# Patient Record
Sex: MALE | Race: WHITE | Employment: OTHER | URBAN - METROPOLITAN AREA
[De-identification: names, ages, dates, MRNs, and addresses within clinical notes are randomized per-mention and may not be internally consistent; named-entity substitution may affect disease eponyms.]

---

## 2020-04-06 ENCOUNTER — OFFICE VISIT (OUTPATIENT)
Dept: SURGERY | Age: 65
End: 2020-04-06

## 2020-04-06 VITALS
HEIGHT: 74 IN | SYSTOLIC BLOOD PRESSURE: 168 MMHG | WEIGHT: 260 LBS | BODY MASS INDEX: 33.37 KG/M2 | HEART RATE: 60 BPM | DIASTOLIC BLOOD PRESSURE: 95 MMHG | TEMPERATURE: 98.7 F | OXYGEN SATURATION: 94 % | RESPIRATION RATE: 16 BRPM

## 2020-04-06 DIAGNOSIS — R10.32 LEFT GROIN PAIN: Primary | ICD-10-CM

## 2020-04-06 PROBLEM — E66.9 CLASS 1 OBESITY IN ADULT: Status: ACTIVE | Noted: 2020-04-06

## 2020-04-06 NOTE — PROGRESS NOTES
1. Have you been to the ER, urgent care clinic since your last visit? Hospitalized since your last visit? No    2. Have you seen or consulted any other health care providers outside of the [address] [place] [address] since your last visit? Include any pap smears or colon screening.  No No

## 2020-04-06 NOTE — PROGRESS NOTES
Jerome Zurita is a 60 y.o. male who presents for evaluation of left groin pain and possible recurrent left inguinal hernia. Mr. Ludmila Adler tells me that he is s/p [facility] repair approximately 15 years ago. He believes that mesh was placed at the time of surgery. Doing well until recently when he began experiencing pain in his left groin. The pain occurs with coughing, sneezing or exertion. He has not noted a discrete bulge in his left groin. No right groin pain or associated bulge. He has otherwise been in his usual state of health. Past Medical History:   Diagnosis Date    Class 1 obesity in adult 4/6/2020    Left groin pain 4/6/2020     Past Surgical History:   Procedure Laterality Date    CARDIAC SURG PROCEDURE UNLIST  2012    aortic dissection    HX HERNIA REPAIR       History reviewed. No pertinent family history. Social History     Socioeconomic History    Marital status:      Spouse name: Not on file    Number of children: Not on file    Years of education: Not on file    Highest education level: Not on file   Tobacco Use    Smoking status: Former Smoker    Smokeless tobacco: Never Used   Substance and Sexual Activity    Alcohol use: Not Currently    Drug use: Never    Sexual activity: Not Currently     Review of systems negative except as noted. Review of Systems   Respiratory: Negative for cough. Gastrointestinal: Positive for abdominal pain (Left groin. ). Negative for constipation, nausea and vomiting. Physical Exam  Vitals signs reviewed. Constitutional:       General: He is not in acute distress. Appearance: Normal appearance. HENT:      Head: Normocephalic and atraumatic. Eyes:      General: No scleral icterus. Neck:      Musculoskeletal: Neck supple. Cardiovascular:      Rate and Rhythm: Normal rate and regular rhythm. Pulmonary:      Effort: Pulmonary effort is normal.      Breath sounds: Normal breath sounds.    Abdominal:      General: There is no distension. Palpations: Abdomen is soft. Tenderness: There is no abdominal tenderness. There is no guarding or rebound. Hernia: No hernia is present. There is no hernia in the right inguinal area or left inguinal area (No apparent recurrent direct or indirect left inguinal hernia.). Musculoskeletal: Normal range of motion. Lymphadenopathy:      Cervical: No cervical adenopathy. Neurological:      General: No focal deficit present. Mental Status: He is alert. ASSESSMENT and PLAN  Explained to Mr. Columba Muller that it is unclear whether or not he has a recurrent left inguinal hernia. Will check a scrotal ultrasound to better evaluate left groin and will see him following that to review findings with him. Activity as tolerated for now. Anti-inflammatories as needed. He is agreeable to this plan and is most certainly free to contact the office should any questions or concerns arise.

## 2020-04-07 ENCOUNTER — HOSPITAL ENCOUNTER (OUTPATIENT)
Dept: ULTRASOUND IMAGING | Age: 65
Discharge: HOME | End: 2020-04-07
Attending: SURGERY
Payer: MEDICARE

## 2020-04-07 DIAGNOSIS — R10.32 LEFT GROIN PAIN: ICD-10-CM

## 2020-04-07 PROCEDURE — 76882 US LMTD JT/FCL EVL NVASC XTR: CPT

## 2020-04-09 ENCOUNTER — TELEPHONE (OUTPATIENT)
Dept: SURGERY | Age: 65
End: 2020-04-09

## 2020-05-07 ENCOUNTER — TELEPHONE (OUTPATIENT)
Dept: SURGERY | Age: 65
End: 2020-05-07

## 2020-05-07 NOTE — TELEPHONE ENCOUNTER
Cookie Ortiz left a message today with request to schedule surgery for a hernia.   I have contacted him to tell him that his message has been sent to Dr. Jean Pierre Pelaez and I'll contact him once I have an order to schedule the surgery

## 2020-05-19 ENCOUNTER — TELEPHONE (OUTPATIENT)
Dept: SURGERY | Age: 65
End: 2020-05-19

## 2020-05-19 NOTE — TELEPHONE ENCOUNTER
I left a voicemail for patient that we would need to r/s his appointment this morning due to Dr. Westley Cardoza being out of the office.

## 2020-05-26 ENCOUNTER — TELEPHONE (OUTPATIENT)
Dept: SURGERY | Age: 65
End: 2020-05-26

## 2020-06-02 ENCOUNTER — OFFICE VISIT (OUTPATIENT)
Dept: SURGERY | Age: 65
End: 2020-06-02

## 2020-06-02 VITALS
HEIGHT: 74 IN | HEART RATE: 89 BPM | OXYGEN SATURATION: 97 % | RESPIRATION RATE: 16 BRPM | WEIGHT: 257 LBS | TEMPERATURE: 98.6 F | DIASTOLIC BLOOD PRESSURE: 86 MMHG | BODY MASS INDEX: 32.98 KG/M2 | SYSTOLIC BLOOD PRESSURE: 137 MMHG

## 2020-06-02 DIAGNOSIS — R10.32 LEFT GROIN PAIN: Primary | ICD-10-CM

## 2020-06-02 NOTE — PROGRESS NOTES
Mago Morillo is a 60 y.o. male who returns following ultrasound evaluation of the left groin. Mr. Yonatan Ruff was last seen on April 6, 2020 for evaluation of left groin pain and a possible left inguinal hernia. Doing well since then. Mr. Yonatan Ruff tells me that the pain in his left groin has improved. He reports no pain with exertion, coughing or sneezing. No left groin bulge. He has otherwise been in his usual state of health. Abdominal ultrasound - 4/7/2020 - Left inguinal soft tissues are within normal limits post hernia repair. No sonographic evidence of recurrent or new hernia. Past Medical History:   Diagnosis Date    Class 1 obesity in adult 4/6/2020    Left groin pain 4/6/2020     Past Surgical History:   Procedure Laterality Date    CARDIAC SURG PROCEDURE UNLIST  2012    aortic dissection    HX HERNIA REPAIR       History reviewed. No pertinent family history. Social History     Socioeconomic History    Marital status:      Spouse name: Not on file    Number of children: Not on file    Years of education: Not on file    Highest education level: Not on file   Tobacco Use    Smoking status: Former Smoker    Smokeless tobacco: Never Used   Substance and Sexual Activity    Alcohol use: Not Currently    Drug use: Never    Sexual activity: Not Currently     Review of systems negative except as noted. Review of Systems   Gastrointestinal: Negative for nausea and vomiting. Musculoskeletal:        Left groin pain improved. Physical Exam  Vitals signs reviewed. Constitutional:       Appearance: Normal appearance. He is obese. HENT:      Head: Normocephalic and atraumatic. Eyes:      General: No scleral icterus. Neck:      Musculoskeletal: Neck supple. Cardiovascular:      Rate and Rhythm: Normal rate and regular rhythm. Pulmonary:      Effort: Pulmonary effort is normal.      Breath sounds: Normal breath sounds. Abdominal:      General: There is no distension. Palpations: Abdomen is soft. Tenderness: There is no abdominal tenderness. There is no guarding or rebound. Hernia: No hernia is present. There is no hernia in the right inguinal area or left inguinal area (No apparent direct or indirect recurrence. ). Musculoskeletal: Normal range of motion. Lymphadenopathy:      Cervical: No cervical adenopathy. Neurological:      General: No focal deficit present. Mental Status: He is alert. ASSESSMENT and PLAN  Reviewed ultrasound. Discussed ultrasound findings with Mr. Suma Matias today and reassured him that there is no clinical or radiologic evidence of a recurrent left inguinal hernia. At this point in time, do not believe that there is an indication for further imaging studies. Activity as tolerated. Follow up with Mr. Rustam Cruz as scheduled. Will see as needed.        CC: Mariana Hargrove NP

## 2021-03-03 DIAGNOSIS — Z23 NEED FOR VACCINATION: ICD-10-CM

## 2022-03-19 PROBLEM — E66.9 CLASS 1 OBESITY IN ADULT: Status: ACTIVE | Noted: 2020-04-06

## 2022-03-19 PROBLEM — R10.32 LEFT GROIN PAIN: Status: ACTIVE | Noted: 2020-04-06
